# Patient Record
Sex: MALE | Race: WHITE | Employment: UNEMPLOYED | URBAN - METROPOLITAN AREA
[De-identification: names, ages, dates, MRNs, and addresses within clinical notes are randomized per-mention and may not be internally consistent; named-entity substitution may affect disease eponyms.]

---

## 2018-10-09 ENCOUNTER — HOSPITAL ENCOUNTER (OUTPATIENT)
Age: 48
Discharge: HOME | End: 2018-10-09
Payer: COMMERCIAL

## 2018-10-09 ENCOUNTER — HOSPITAL ENCOUNTER (OUTPATIENT)
Dept: GENERAL RADIOLOGY | Age: 48
Discharge: HOME | End: 2018-10-09
Payer: COMMERCIAL

## 2018-10-09 DIAGNOSIS — M54.2 CERVICALGIA: ICD-10-CM

## 2018-10-09 PROCEDURE — 72040 X-RAY EXAM NECK SPINE 2-3 VW: CPT

## 2023-08-18 ENCOUNTER — OFFICE VISIT (OUTPATIENT)
Dept: PULMONOLOGY | Age: 53
End: 2023-08-18

## 2023-08-18 VITALS
DIASTOLIC BLOOD PRESSURE: 74 MMHG | OXYGEN SATURATION: 97 % | WEIGHT: 177.8 LBS | SYSTOLIC BLOOD PRESSURE: 118 MMHG | RESPIRATION RATE: 18 BRPM | HEIGHT: 70 IN | BODY MASS INDEX: 25.45 KG/M2 | HEART RATE: 94 BPM

## 2023-08-18 DIAGNOSIS — R06.83 SNORING: ICD-10-CM

## 2023-08-18 DIAGNOSIS — Z87.891 PERSONAL HISTORY OF TOBACCO USE: ICD-10-CM

## 2023-08-18 DIAGNOSIS — F51.04 PSYCHOPHYSIOLOGICAL INSOMNIA: ICD-10-CM

## 2023-08-18 DIAGNOSIS — G47.19 EXCESSIVE DAYTIME SLEEPINESS: Primary | ICD-10-CM

## 2023-08-18 DIAGNOSIS — R53.82 CHRONIC FATIGUE: ICD-10-CM

## 2023-08-18 RX ORDER — ATORVASTATIN CALCIUM 10 MG/1
10 TABLET, FILM COATED ORAL DAILY
COMMUNITY
Start: 2023-06-19

## 2023-08-18 RX ORDER — ACETAMINOPHEN 325 MG/1
650 TABLET ORAL EVERY 6 HOURS PRN
COMMUNITY

## 2023-08-18 RX ORDER — LISINOPRIL 20 MG/1
20 TABLET ORAL DAILY
COMMUNITY
Start: 2023-06-19

## 2023-08-18 ASSESSMENT — SLEEP AND FATIGUE QUESTIONNAIRES
HOW LIKELY ARE YOU TO NOD OFF OR FALL ASLEEP IN A CAR, WHILE STOPPED FOR A FEW MINUTES IN TRAFFIC: 0
HOW LIKELY ARE YOU TO NOD OFF OR FALL ASLEEP WHEN YOU ARE A PASSENGER IN A CAR FOR AN HOUR WITHOUT A BREAK: 2
HOW LIKELY ARE YOU TO NOD OFF OR FALL ASLEEP WHILE SITTING INACTIVE IN A PUBLIC PLACE: 3
HOW LIKELY ARE YOU TO NOD OFF OR FALL ASLEEP WHILE SITTING QUIETLY AFTER LUNCH WITHOUT ALCOHOL: 3
HOW LIKELY ARE YOU TO NOD OFF OR FALL ASLEEP WHILE LYING DOWN TO REST IN THE AFTERNOON WHEN CIRCUMSTANCES PERMIT: 3
ESS TOTAL SCORE: 18
HOW LIKELY ARE YOU TO NOD OFF OR FALL ASLEEP WHILE SITTING AND TALKING TO SOMEONE: 1
NECK CIRCUMFERENCE (INCHES): 13.75
HOW LIKELY ARE YOU TO NOD OFF OR FALL ASLEEP WHILE WATCHING TV: 3
HOW LIKELY ARE YOU TO NOD OFF OR FALL ASLEEP WHILE SITTING AND READING: 3

## 2023-08-18 NOTE — PROGRESS NOTES
PULMONARY, CRITICAL CARE AND SLEEP MEDICINE   Outpatient Sleep Consult Note  CC: \"I'm always exhausted, I feel like I'm going to pass out and I can't focus\"   Referring Provider: Patient is being seen at the request of Dr. Brian Oneill for a consultation to evaluate for Obstructive Sleep Apnea. 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 Associated with snoring of unknown severity and daytime sleepiness that interferes with his daily functioning because he cannot focus on a task. He feels so exhausted all the time that he does not want to do anything, i.e. including almost cancelled this appointment. 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 He thought that it was simply stress and anxiety from taking care of his mother but it continues to persist and worsen even 6 months beyond her passing. Unknown if apneas are present as he has not had a sleep partner in several years. & observed apneas. + AM headaches. No treatments tried so far & has not been evaluated for sleep apnea in the past.  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 Gets ~ 4-5 hrs of sleep per night \"if I'm marisa\" and 1-2 hour naps during the day most days of the week. Bedtime can be 8-9 pm & rise time would be ~2 am and then unable to go back to sleep. To restroom 0x/night. [place] is 18. No car wrecks/near wrecks because of the sleepiness or nodding off while driving. Weight: stable / unchanged. Current smoker, 1 ppd since 13 yo. No known cardiopulmonary problems, no pulm sxs. No known family history of JOSELITO.      reports that he has been smoking cigarettes. He started smoking about 38 years ago. He has a 37.00 pack-year smoking history.

## 2023-08-18 NOTE — PATIENT INSTRUCTIONS
What's next:  Schedule a study with the sleep lab (call them at [phone] if you do not receive their call.)    Read through the sleep hygiene tips below to see what kind of changes you can start working on in the meantime, while awaiting your sleep study. We will meet after your sleep study to discuss results, options and recommendations from there. It was great to meet you Rasheed Naidu! My sincere condolences for the loss of your mother. Be sure to take care of yourself. -[address] [place] [address]    ______________________________________________________________________  Never drive a car or operate a motorized vehicle while drowsy or sleepy.   ______________________________________________________________________        Sleep Hygiene. .. Important practices for better sleep:    Avoid naps. This will ensure you are sleepy at bedtime. If you have to take a nap, sleep less than 1 hour, before 3 pm.  SCHEDULE: Have a fixed bedtime and awakening time. The human body thrives on routines. Only deviate from these set sleep times about 1-2 hours on the weekends (more than this will start altering your internal clock). You will feel better keeping a regular sleep cycle and giving your body a dependable pattern, even (especially) if you are retired or not working. Use light to train your biological clock: When you get up in the morning, exposure yourself to bright lights. When preparing for bed, dim the lights and avoid exposure to screens. The blue light from electronic screens tells our brain that it is time to be awake; it inhibits melatonin production which stops our brain from helping us get to sleep. Go to bed only when sleepy; this reduces the time you are awake in bed (which can lead to frustration and negative thoughts about sleep). If you can't fall asleep within 15-30 minutes, get up and do something boring until you feel sleepy again. Sit quietly in the dark or read the warranty on your refrigerator.  Don't

## 2023-08-25 ENCOUNTER — HOSPITAL ENCOUNTER (OUTPATIENT)
Dept: CT IMAGING | Age: 53
Discharge: HOME | End: 2023-08-25
Payer: COMMERCIAL

## 2023-08-25 DIAGNOSIS — Z87.891 PERSONAL HISTORY OF TOBACCO USE: ICD-10-CM

## 2023-08-25 PROCEDURE — 71271 CT THORAX LUNG CANCER SCR C-: CPT

## 2023-08-31 ENCOUNTER — HOSPITAL ENCOUNTER (OUTPATIENT)
Dept: SLEEP CENTER | Age: 53
Discharge: HOME | End: 2023-09-02
Payer: COMMERCIAL

## 2023-08-31 DIAGNOSIS — R53.82 CHRONIC FATIGUE: ICD-10-CM

## 2023-08-31 DIAGNOSIS — R06.83 SNORING: ICD-10-CM

## 2023-08-31 DIAGNOSIS — G47.19 EXCESSIVE DAYTIME SLEEPINESS: ICD-10-CM

## 2023-08-31 PROCEDURE — 95806 SLEEP STUDY UNATT&RESP EFFT: CPT

## 2023-09-04 PROBLEM — R06.83 SNORING: Status: ACTIVE | Noted: 2023-09-04

## 2023-09-04 PROBLEM — G47.19 EXCESSIVE DAYTIME SLEEPINESS: Status: ACTIVE | Noted: 2023-09-04

## 2023-09-04 PROBLEM — R53.82 CHRONIC FATIGUE: Status: ACTIVE | Noted: 2023-09-04

## 2023-09-22 ENCOUNTER — OFFICE VISIT (OUTPATIENT)
Dept: PULMONOLOGY | Age: 53
End: 2023-09-22

## 2023-09-22 ENCOUNTER — TELEPHONE (OUTPATIENT)
Dept: PULMONOLOGY | Age: 53
End: 2023-09-22

## 2023-09-22 VITALS
HEART RATE: 94 BPM | HEIGHT: 70 IN | BODY MASS INDEX: 26.05 KG/M2 | SYSTOLIC BLOOD PRESSURE: 124 MMHG | WEIGHT: 182 LBS | RESPIRATION RATE: 18 BRPM | DIASTOLIC BLOOD PRESSURE: 80 MMHG | OXYGEN SATURATION: 98 %

## 2023-09-22 DIAGNOSIS — R53.82 CHRONIC FATIGUE: ICD-10-CM

## 2023-09-22 DIAGNOSIS — G47.19 EXCESSIVE DAYTIME SLEEPINESS: ICD-10-CM

## 2023-09-22 DIAGNOSIS — J43.2 CENTRILOBULAR EMPHYSEMA (HCC): ICD-10-CM

## 2023-09-22 DIAGNOSIS — G47.33 OSA (OBSTRUCTIVE SLEEP APNEA): Primary | ICD-10-CM

## 2023-09-22 PROBLEM — R06.83 SNORING: Status: RESOLVED | Noted: 2023-09-04 | Resolved: 2023-09-22

## 2023-09-22 ASSESSMENT — SLEEP AND FATIGUE QUESTIONNAIRES
NECK CIRCUMFERENCE (INCHES): 14
HOW LIKELY ARE YOU TO NOD OFF OR FALL ASLEEP WHILE SITTING QUIETLY AFTER LUNCH WITHOUT ALCOHOL: 3
HOW LIKELY ARE YOU TO NOD OFF OR FALL ASLEEP WHILE LYING DOWN TO REST IN THE AFTERNOON WHEN CIRCUMSTANCES PERMIT: 2
HOW LIKELY ARE YOU TO NOD OFF OR FALL ASLEEP WHILE SITTING AND TALKING TO SOMEONE: 1
HOW LIKELY ARE YOU TO NOD OFF OR FALL ASLEEP WHEN YOU ARE A PASSENGER IN A CAR FOR AN HOUR WITHOUT A BREAK: 2
ESS TOTAL SCORE: 14
HOW LIKELY ARE YOU TO NOD OFF OR FALL ASLEEP IN A CAR, WHILE STOPPED FOR A FEW MINUTES IN TRAFFIC: 0
HOW LIKELY ARE YOU TO NOD OFF OR FALL ASLEEP WHILE WATCHING TV: 2
HOW LIKELY ARE YOU TO NOD OFF OR FALL ASLEEP WHILE SITTING AND READING: 3
HOW LIKELY ARE YOU TO NOD OFF OR FALL ASLEEP WHILE SITTING INACTIVE IN A PUBLIC PLACE: 1

## 2023-09-22 NOTE — PROGRESS NOTES
PULMONARY, CRITICAL CARE AND SLEEP MEDICINE   Outpatient Sleep Progress Note  CC: \"I'm always exhausted, I feel like I'm going to pass out and I can't focus\"   Referring Provider: Dr. Clau Zuluaga    Interval History 9/22/23:  f/u HST  -  Had HST 9/1/23 demonstrating moderate JOSELITO with AHI 16. ESS is: 14.   Emphysema is a new dx for him, he struggles with an intermittent cough but otherwise does OK with his breathing. He is under a significant amount of stress since his mother passed and is not ready to think about quitting smoking. He is exhausted and really just wanting to feel better. Is receptive to CPAP treatment. Mentions several years of intermittent imbalance and gait instability, had a bad episode the other night and fell into a door frame. 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 Associated with snoring of unknown severity and daytime sleepiness that interferes with his daily functioning because he cannot focus on a task. He feels so exhausted all the time that he does not want to do anything, i.e. including almost cancelled this appointment. 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 He thought that it was simply stress and anxiety from taking care of his mother but it continues to persist and worsen even 6 months beyond her passing. Unknown if apneas are present as he has not had a sleep partner in several years. & observed apneas. + AM headaches. No treatments tried so far & has not been evaluated for sleep apnea in the past.  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 Gets ~ 4-5 hrs of sleep per night \"if I'm marisa\" and 1-2 hour naps during the day most days of the week.   Bedtime can be

## 2023-09-22 NOTE — PATIENT INSTRUCTIONS
What's next:  Trying auto-CPAP:  We will wait to hear from you which medical supply company (\"DME company\") we should send the CPAP prescription to (see list of companies below). The best way to choose a company is by [employer] and asking which DME companies are in network for you, and choosing from those options. That DME company will get you set up with your machine and equipment. After getting home with your machine, I recommend trying to de-sensitize yourself to the CPAP & mask--people often acclimate better if they try it out during the day and practice breathing with it while focusing on another task. You can ask your DME for a different mask if what you first tried isn't comfortable. Also, feel free to call the office if the air pressures are not tolerable--I can send in an order for them to be changed, while keeping your treatment efficacy in mind. After getting set up with CPAP, you come back and see me within 31-90 days. At this appointment, insurance typically needs to see that you are using the device at least 4 hours each night for at least 70% of nights in a month. It was great to see you again and take care [facility]      ______________________________________________________________________  Never drive a car or operate a motorized vehicle while drowsy or sleepy.   ______________________________________________________________________        Sleep Hygiene. .. Important practices for better sleep:    Avoid naps. This will ensure you are sleepy at bedtime. If you have to take a nap, sleep less than 1 hour, before 3 pm.  SCHEDULE: Have a fixed bedtime and awakening time. The human body thrives on routines. Only deviate from these set sleep times about 1-2 hours on the weekends (more than this will start altering your internal clock).  You will feel better keeping a regular sleep cycle and giving your body a dependable pattern, even (especially) if you are retired

## 2023-09-25 NOTE — TELEPHONE ENCOUNTER
Faxed orders, demographics, ins card, ov notes and recent sleep study to [phone] [phone]

## 2023-11-27 ENCOUNTER — TELEPHONE (OUTPATIENT)
Dept: PULMONOLOGY | Age: 53
End: 2023-11-27

## 2023-11-27 NOTE — TELEPHONE ENCOUNTER
Patient LVM to cancel appointment on 11/27/23 with Brooklyn Sousa for 31-90 day appt.    Reason: per appt notes-Patient has not picked up Cpap. Called to see if he was still planning picking it up but had to leave messag     Patient did not reschedule appointment.    Appointment rescheduled for .     Last OV 9/22/23:      ASSESSMENT:  JOSELITO  Severe chronic fatigue   Excessive daytime sleepiness  Snoring  Insomnia, psychosocial   Poor sleep hygiene   Centrilobular emphysema on imaging, new dx   Depression - untreated   Anxiety - untreated  Comorbid conditions: Overweight, HTN, HLD  Current smoker, 1 ppd since 14 yo (1985), 37 pack yrs      PLAN:   Start APAP 5-15 cmH2O.  Await to hear pt's DME choice.   Sleep hygiene & CPAP cleaning tips discussed & provided  Patient has been advised: no driving while sleepy; weight loss recommended.  Pathophysiology & complications of untreated JOSELITO have been discussed.  Systemic benefits of CPAP therapy have been discussed.   Discussed alternatives to CPAP therapy, including HNS & MAD  Smoking cessation again recommended, not ready.  Thoroughly reviewed pathophysiology of emphysema and how to alter its progression.   I recommended patient discuss gait instability/imbalance with his PCP as well as anxiety/depression with his PCP or see a mental health specialist, he is agreeable to both   LDCT for LCS due Aug 2024, schedule at future visit   F/U 31-90

## 2024-07-26 ENCOUNTER — TELEPHONE (OUTPATIENT)
Dept: TELEMETRY | Age: 54
End: 2024-07-26

## 2024-07-26 NOTE — TELEPHONE ENCOUNTER
Patient due for annual CT Lung Screening. Reminder letter mailed.    Routed to PCP, Lillian Zhao NP with [facility].    Veronica Lancaster RN       Rx Refill Note  Requested Prescriptions     Pending Prescriptions Disp Refills    promethazine (PHENERGAN) 25 MG tablet [Pharmacy Med Name: PROMETHAZINE 25 MG TABLET] 90 tablet 0     Sig: TAKE ONE TABLET BY MOUTH EVERY 8 HOURS AS NEEDED FOR NAUSEA OR VOMITING      Last office visit with prescribing clinician: 1/18/2024   Last telemedicine visit with prescribing clinician: Visit date not found   Next office visit with prescribing clinician: 7/18/2024     Peri Malin LPN  02/23/24, 12:29 EST    LF-1/23/24 #90, RF-0

## 2024-09-27 ENCOUNTER — TELEPHONE (OUTPATIENT)
Dept: TELEMETRY | Age: 54
End: 2024-09-27

## 2024-10-24 ENCOUNTER — TELEPHONE (OUTPATIENT)
Dept: TELEMETRY | Age: 54
End: 2024-10-24

## 2024-10-24 NOTE — TELEPHONE ENCOUNTER
Patient due for annual CT Lung Screening. Reminder letter mailed.    Routed to PCP, Lillian Zhao NP with [facility]. Final notice.    Veronica Lancaster RN